# Patient Record
Sex: FEMALE | NOT HISPANIC OR LATINO | Employment: FULL TIME | URBAN - METROPOLITAN AREA
[De-identification: names, ages, dates, MRNs, and addresses within clinical notes are randomized per-mention and may not be internally consistent; named-entity substitution may affect disease eponyms.]

---

## 2017-02-09 NOTE — PROGRESS NOTES
SUBJECTIVE:     CC: Erika Jasmine is an 21 year old woman who presents for preventive health visit.     Healthy Habits:    Do you get at least three servings of calcium containing foods daily (dairy, green leafy vegetables, etc.)? yes    Amount of exercise or daily activities, outside of work: 0 day(s) per week    Problems taking medications regularly No    Medication side effects: No    Have you had an eye exam in the past two years? no    Do you see a dentist twice per year? no  Do you have sleep apnea, excessive snoring or daytime drowsiness?yes      Gets panic attacks about 2-3 times a week.    Has seen therapist a long time ago.  Not interested in this.   Denies suicidal or homicidal thoughts.  Patient instructed to go to the emergency room or call 911 if these occur.  No depression.     Is engaged, has been with same partner for years.   Is trying to have a baby, has irregular periods. Has tried for years.  Has not met with fertility  specialist.     Never had pap smear.  Is due today.   No FH of breast cancer.       Smoker-tried to cut back but failed.  Is interested in trying something to help.       Today's PHQ-2 Score: No flowsheet data found.    Abuse: Current or Past(Physical, Sexual or Emotional)- No  Do you feel safe in your environment - Yes    Social History   Substance Use Topics     Smoking status: Current Every Day Smoker     Packs/day: 0.50     Years: 10.00     Types: Cigarettes     Smokeless tobacco: Not on file     Alcohol use Yes      Comment: OCC     The patient does not drink >3 drinks per day nor >7 drinks per week.    No results for input(s): CHOL, HDL, LDL, TRIG, CHOLHDLRATIO, NHDL in the last 66110 hours.    Reviewed orders with patient.  Reviewed health maintenance and updated orders accordingly - Yes    Mammo Decision Support:  Mammogram not appropriate for this patient based on age.    Pertinent mammograms are reviewed under the imaging tab.  History of abnormal Pap smear: NO -  "age 21-29 PAP every 3 years recommended  All Histories reviewed and updated in Epic.  Past Medical History   Diagnosis Date     NO ACTIVE PROBLEMS       Past Surgical History   Procedure Laterality Date     Orthopedic surgery       L foot surgery at the age of 7 yrs old     Obstetric History     No data available          ROS:  C: NEGATIVE for fever, chills, change in weight  I: NEGATIVE for worrisome rashes, moles or lesions  E: NEGATIVE for vision changes or irritation  ENT: NEGATIVE for ear, mouth and throat problems  R: NEGATIVE for significant cough or SOB  B: NEGATIVE for masses, tenderness or discharge  CV: NEGATIVE for chest pain, palpitations or peripheral edema  GI: NEGATIVE for nausea, abdominal pain, heartburn, or change in bowel habits  M: NEGATIVE for significant arthralgias or myalgia  N: NEGATIVE for weakness, dizziness or paresthesias    Problem list, Medication list, Allergies, and Medical/Social/Surgical histories reviewed in [facility] and updated as appropriate.  OBJECTIVE:     /74  Pulse 82  Temp 99.1  F (37.3  C) (Oral)  Ht 5' 4\" (1.626 m)  Wt 119 lb (54 kg)  LMP 02/06/2017  SpO2 99%  Breastfeeding? No  BMI 20.43 kg/m2  EXAM:  GENERAL: healthy, alert and no distress  EYES: Eyes grossly normal to inspection, PERRL and conjunctivae and sclerae normal  HENT: ear canals and TM's normal, nose and mouth without ulcers or lesions  NECK: no adenopathy, no asymmetry, masses, or scars and thyroid normal to palpation  RESP: lungs clear to auscultation - no rales, rhonchi or wheezes  BREAST: normal without masses, tenderness or nipple discharge and no palpable axillary masses or adenopathy  CV: regular rate and rhythm, normal S1 S2, no S3 or S4, no murmur, click or rub, no peripheral edema and peripheral pulses strong  ABDOMEN: soft, nontender, no hepatosplenomegaly, no masses and bowel sounds normal   (female): normal female external genitalia, normal urethral meatus, vaginal mucosa pink, moist, well " "rugated, and normal cervix/adnexa/uterus without masses or discharge  MS: no gross musculoskeletal defects noted, no edema  SKIN: no suspicious lesions or rashes  NEURO: Normal strength and tone, mentation intact and speech normal  PSYCH: mentation appears normal, affect normal/bright    ASSESSMENT/PLAN:     1. Encounter for routine adult health examination with abnormal findings      2. Screening examination for venereal disease    - NEISSERIA GONORRHOEA PCR  - CHLAMYDIA TRACHOMATIS PCR    3. Screening for malignant neoplasm of cervix      4. Need for prophylactic vaccination with tetanus-diphtheria (TD)    - TDAP (ADACEL AGES 11-64)    5. Tobacco abuse  Willing to try to quit, will try patches  - nicotine (NICODERM CQ) 14 MG/24HR 24 hr patch; Place 1 patch onto the skin every 24 hours Take for 6 weeks.  Dispense: 30 patch; Refill: 1  - nicotine (NICODERM CQ) 7 MG/24HR 24 hr patch; Place 1 patch onto the skin every 24 hours For 2 weeks  Dispense: 14 patch; Refill: 0    6. Female infertility  Several years and has not conceived, engaged currently    - INFERTILITY/AI REFERRAL    7. Anxiety  Side effects discussed  Recheck in clinic if not improving  Strongly encourage thinking about therapy  - hydrOXYzine (ATARAX) 25 MG tablet; Take 1-2 tablets once daily as needed for acute anxiety. Do not mix with alcohol.  Dispense: 60 tablet; Refill: 1    COUNSELING:   Reviewed preventive health counseling, as reflected in patient instructions       Regular exercise       Healthy diet/nutrition       Vision screening       Hearing screening         reports that she has been smoking Cigarettes.  She has a 5.00 pack-year smoking history. She does not have any smokeless tobacco history on file.  Tobacco Cessation Action Plan: Pharmacotherapies : Nicotine patch  Estimated body mass index is 20.43 kg/(m^2) as calculated from the following:    Height as of this encounter: 5' 4\" (1.626 m).    Weight as of this encounter: 119 lb (54 kg). "       Counseling Resources:  ATP IV Guidelines  Pooled Cohorts Equation Calculator  Breast Cancer Risk Calculator  FRAX Risk Assessment  ICSI Preventive Guidelines  Dietary Guidelines for Americans, 2010  USDA's MyPlate  ASA Prophylaxis  Lung CA Screening    Patient Instructions     Preventive Health Recommendations  Female Ages 18 to 25     Yearly exam:     See your health care provider every year in order to  o Review health changes.   o Discuss preventive care.    o Review your medicines if your doctor has prescribed any.      You should be tested each year for STDs (sexually transmitted diseases).       After age 20, talk to your provider about how often you should have cholesterol testing.      Starting at age 21, get a Pap test every three years. If you have an abnormal result, your doctor may have you test more often.      If you are at risk for diabetes, you should have a diabetes test (fasting glucose).     Shots:     Get a flu shot each year.     Get a tetanus shot every 10 years.     Consider getting the shot (vaccine) that prevents cervical cancer (Gardasil).    Nutrition:     Eat at least 5 servings of fruits and vegetables each day.    Eat whole-grain bread, whole-wheat pasta and brown rice instead of white grains and rice.    Talk to your provider about Calcium and Vitamin D.     Lifestyle    Exercise at least 150 minutes a week each week (30 minutes a day, 5 days a week). This will help you control your weight and prevent disease.    Limit alcohol to one drink per day.    No smoking.     Wear sunscreen to prevent skin cancer.    See your dentist every six months for an exam and cleaning.      Karissa Xie PA-C  [facility]

## 2017-02-16 ENCOUNTER — OFFICE VISIT (OUTPATIENT)
Dept: FAMILY MEDICINE | Facility: CLINIC | Age: 22
End: 2017-02-16
Payer: COMMERCIAL

## 2017-02-16 VITALS
HEART RATE: 82 BPM | TEMPERATURE: 99.1 F | SYSTOLIC BLOOD PRESSURE: 124 MMHG | BODY MASS INDEX: 20.32 KG/M2 | HEIGHT: 64 IN | WEIGHT: 119 LBS | DIASTOLIC BLOOD PRESSURE: 74 MMHG | OXYGEN SATURATION: 99 %

## 2017-02-16 DIAGNOSIS — Z00.01 ENCOUNTER FOR ROUTINE ADULT HEALTH EXAMINATION WITH ABNORMAL FINDINGS: Primary | ICD-10-CM

## 2017-02-16 DIAGNOSIS — N97.9 FEMALE INFERTILITY: ICD-10-CM

## 2017-02-16 DIAGNOSIS — Z72.0 TOBACCO ABUSE: ICD-10-CM

## 2017-02-16 DIAGNOSIS — F41.9 ANXIETY: ICD-10-CM

## 2017-02-16 DIAGNOSIS — Z12.4 SCREENING FOR MALIGNANT NEOPLASM OF CERVIX: ICD-10-CM

## 2017-02-16 DIAGNOSIS — Z23 NEED FOR PROPHYLACTIC VACCINATION WITH TETANUS-DIPHTHERIA (TD): ICD-10-CM

## 2017-02-16 DIAGNOSIS — Z11.3 SCREENING EXAMINATION FOR VENEREAL DISEASE: ICD-10-CM

## 2017-02-16 LAB
C TRACH DNA SPEC QL NAA+PROBE: NORMAL
N GONORRHOEA DNA SPEC QL NAA+PROBE: NORMAL
SPECIMEN SOURCE: NORMAL
SPECIMEN SOURCE: NORMAL

## 2017-02-16 PROCEDURE — 99385 PREV VISIT NEW AGE 18-39: CPT | Performed by: PHYSICIAN ASSISTANT

## 2017-02-16 PROCEDURE — 87491 CHLMYD TRACH DNA AMP PROBE: CPT | Performed by: PHYSICIAN ASSISTANT

## 2017-02-16 PROCEDURE — 87591 N.GONORRHOEAE DNA AMP PROB: CPT | Performed by: PHYSICIAN ASSISTANT

## 2017-02-16 PROCEDURE — G0145 SCR C/V CYTO,THINLAYER,RESCR: HCPCS | Performed by: PHYSICIAN ASSISTANT

## 2017-02-16 RX ORDER — HYDROXYZINE HYDROCHLORIDE 25 MG/1
25-50 TABLET, FILM COATED ORAL EVERY 6 HOURS PRN
Qty: 60 TABLET | Refills: 1 | Status: SHIPPED | OUTPATIENT
Start: 2017-02-16 | End: 2017-02-16

## 2017-02-16 RX ORDER — NICOTINE 21 MG/24HR
1 PATCH, TRANSDERMAL 24 HOURS TRANSDERMAL EVERY 24 HOURS
Qty: 30 PATCH | Refills: 1 | Status: SHIPPED | OUTPATIENT
Start: 2017-02-16

## 2017-02-16 RX ORDER — HYDROXYZINE HYDROCHLORIDE 25 MG/1
TABLET, FILM COATED ORAL
Qty: 60 TABLET | Refills: 1 | Status: SHIPPED | OUTPATIENT
Start: 2017-02-16

## 2017-02-16 NOTE — LETTER
[facility]  [address] [id number] [phone]        February 17, 2017    Erika Jasmine  [address] [place] [address]            Dear Erika,    The results of your recent tests were normal.   It was a pleasure to see you at your last appointment.    If you have any questions or concerns, please call myself or my nurse at [phone].    Sincerely,    Karissa Xie PA-C

## 2017-02-16 NOTE — NURSING NOTE
"Chief Complaint   Patient presents with     Physical     AFE, Pt is not fasting and did not have labs done        Initial /74  Pulse 82  Temp 99.1  F (37.3  C) (Oral)  Ht 5' 4\" (1.626 m)  Wt 119 lb (54 kg)  LMP 02/06/2017  SpO2 99%  Breastfeeding? No  BMI 20.43 kg/m2 Estimated body mass index is 20.43 kg/(m^2) as calculated from the following:    Height as of this encounter: 5' 4\" (1.626 m).    Weight as of this encounter: 119 lb (54 kg).  Medication Reconciliation: complete      Jael Elder MA    "

## 2017-02-16 NOTE — MR AVS SNAPSHOT
After Visit Summary   2/16/2017    Erika Jasmine    MRN: [id number]           Patient Information     Date Of Birth          1995        Visit Information        Provider Department      2/16/2017 9:20 AM Karissa Xie PA-C [facility]        Today's Diagnoses     Encounter for routine adult health examination with abnormal findings    -  1    Screening examination for venereal disease        Screening for malignant neoplasm of cervix        Need for prophylactic vaccination with tetanus-diphtheria (TD)        Tobacco abuse        Female infertility        Anxiety          Care Instructions      Preventive Health Recommendations  Female Ages 18 to 25     Yearly exam:     See your health care provider every year in order to  o Review health changes.   o Discuss preventive care.    o Review your medicines if your doctor has prescribed any.      You should be tested each year for STDs (sexually transmitted diseases).       After age 20, talk to your provider about how often you should have cholesterol testing.      Starting at age 21, get a Pap test every three years. If you have an abnormal result, your doctor may have you test more often.      If you are at risk for diabetes, you should have a diabetes test (fasting glucose).     Shots:     Get a flu shot each year.     Get a tetanus shot every 10 years.     Consider getting the shot (vaccine) that prevents cervical cancer (Gardasil).    Nutrition:     Eat at least 5 servings of fruits and vegetables each day.    Eat whole-grain bread, whole-wheat pasta and brown rice instead of white grains and rice.    Talk to your provider about Calcium and Vitamin D.     Lifestyle    Exercise at least 150 minutes a week each week (30 minutes a day, 5 days a week). This will help you control your weight and prevent disease.    Limit alcohol to one drink per day.    No smoking.     Wear sunscreen to prevent skin cancer.    See your  "dentist every six months for an exam and cleaning.        Follow-ups after your visit        Additional Services     INFERTILITY/AI REFERRAL       Your provider has referred you to: FMRITA: [facility] [phone]   [email or web address]    Please be aware that coverage of these services is subject to the terms and limitations of your health insurance plan.  Call member services at your health plan with any benefit or coverage questions.      Please bring the following with you to your appointment:    (1) Any X-Rays, CTs or MRIs which have been performed.  Contact the facility where they were done to arrange for  prior to your scheduled appointment.    (2) List of current medications   (3) This referral request   (4) Any documents/labs given to you for this referral                  Who to contact     If you have questions or need follow up information about today's clinic visit or your schedule please contact [facility] directly at [phone].  Normal or non-critical lab and imaging results will be communicated to you by MyChart, letter or phone within 4 business days after the clinic has received the results. If you do not hear from us within 7 days, please contact the clinic through [employer] or phone. If you have a critical or abnormal lab result, we will notify you by phone as soon as possible.  Submit refill requests through [employer] or call your pharmacy and they will forward the refill request to us. Please allow 3 business days for your refill to be completed.          Additional Information About Your Visit        [employer] Information     [employer] lets you send messages to your doctor, view your test results, renew your prescriptions, schedule appointments and more. To sign up, go to [email or web address] . Click on \"Log in\" on the left side of the screen, which will take you to the Welcome page. Then click on \"Sign up Now\" on the right side of " "the page.     You will be asked to enter the access code listed below, as well as some personal information. Please follow the directions to create your username and password.     Your access code is: [email or web address]  Expires: 2017  8:48 AM     Your access code will  in 90 days. If you need help or a new code, please call your [facility] clinic or [phone].        Care EveryWhere ID     This is your Care EveryWhere ID. This could be used by other organizations to access your [facility] medical records  [id number]        Your Vitals Were     Pulse Temperature Height Last Period Pulse Oximetry Breastfeeding?    82 99.1  F (37.3  C) (Oral) 5' 4\" (1.626 m) 2017 99% No    BMI (Body Mass Index)                   20.43 kg/m2            Blood Pressure from Last 3 Encounters:   17 124/74    Weight from Last 3 Encounters:   17 119 lb (54 kg)              We Performed the Following     CHLAMYDIA TRACHOMATIS PCR     INFERTILITY/AI REFERRAL     NEISSERIA GONORRHOEA PCR     TDAP (ADACEL AGES 11-64)          Today's Medication Changes          These changes are accurate as of: 17  9:56 AM.  If you have any questions, ask your nurse or doctor.               Start taking these medicines.        Dose/Directions    hydrOXYzine 25 MG tablet   Commonly known as:  ATARAX   Used for:  Anxiety   Started by:  Karissa Xie PA-C        Take 1-2 tablets once daily as needed for acute anxiety. Do not mix with alcohol.   Quantity:  60 tablet   Refills:  1       * nicotine 14 MG/24HR 24 hr patch   Commonly known as:  NICODERM CQ   Used for:  Tobacco abuse   Started by:  Karissa Xie PA-C        Dose:  1 patch   Place 1 patch onto the skin every 24 hours Take for 6 weeks.   Quantity:  30 patch   Refills:  1       * nicotine 7 MG/24HR 24 hr patch   Commonly known as:  NICODERM CQ   Used for:  Tobacco abuse   Started by:  Karissa Xie PA-C        Dose:  1 patch   Place 1 patch " onto the skin every 24 hours For 2 weeks   Quantity:  14 patch   Refills:  0       * Notice:  This list has 2 medication(s) that are the same as other medications prescribed for you. Read the directions carefully, and ask your doctor or other care provider to review them with you.         Where to get your medicines      These medications were sent to [facility] [address] [place], [place] - [address] [address]     Phone:  [phone]     hydrOXYzine 25 MG tablet    nicotine 14 MG/24HR 24 hr patch    nicotine 7 MG/24HR 24 hr patch                Primary Care Provider    None Specified       No primary provider on file.        Thank you!     Thank you for choosing [facility]  for your care. Our goal is always to provide you with excellent care. Hearing back from our patients is one way we can continue to improve our services. Please take a few minutes to complete the written survey that you may receive in the mail after your visit with us. Thank you!             Your Updated Medication List - Protect others around you: Learn how to safely use, store and throw away your medicines at www.disposemymeds.org.          This list is accurate as of: 2/16/17  9:56 AM.  Always use your most recent med list.                   Brand Name Dispense Instructions for use    hydrOXYzine 25 MG tablet    ATARAX    60 tablet    Take 1-2 tablets once daily as needed for acute anxiety. Do not mix with alcohol.       * nicotine 14 MG/24HR 24 hr patch    NICODERM CQ    30 patch    Place 1 patch onto the skin every 24 hours Take for 6 weeks.       * nicotine 7 MG/24HR 24 hr patch    NICODERM CQ    14 patch    Place 1 patch onto the skin every 24 hours For 2 weeks       * Notice:  This list has 2 medication(s) that are the same as other medications prescribed for you. Read the directions carefully, and ask your doctor or other care provider to review them with you.

## 2017-02-16 NOTE — LETTER
[facility]  [address] [facility] [id number] [phone]      February 21, 2017    Erika Jasmine  [address] [place] [address]          Dear Erika,    I am happy to inform you that your recent cervical cancer screening test (PAP smear) was normal.      Preventative screening such as this helps insure your health for years to come.  This test should be repeated in 3 years unless otherwise directed.    You will still need to return to the clinic every year for your annual exam and other preventive tests.    Please contact the clinic if you have further questions.      Sincerely,    Karissa Xie PA-C/kelsy

## 2017-02-17 NOTE — PROGRESS NOTES
Dear Erika,      It was a pleasure to see you at your recent office visit.  Your test results are listed below. STD testing negative. No chlamydia or gonorrhea. Always use condoms to prevent STDs. Re-check in one year or after changing sexual partners.           If you have any questions or concerns, please call the clinic at [phone].    Sincerely,  Karissa Xie PA-C

## 2017-02-20 LAB
COPATH REPORT: NORMAL
PAP: NORMAL

## 2019-11-30 ENCOUNTER — TELEPHONE (OUTPATIENT)
Dept: OTHER | Facility: CLINIC | Age: 24
End: 2019-11-30

## 2019-11-30 NOTE — TELEPHONE ENCOUNTER
11/30/2019    Call Regarding ReattributionPhysical       Attempt 2    Message on voicemail    Comments:       Outreach   GB

## 2019-12-23 NOTE — TELEPHONE ENCOUNTER
12/23/2019    Call Regarding ReattributionPhysical       Attempt 3    Message on voicemail    Comments:       Outreach   CS

## 2023-11-17 ENCOUNTER — HOSPITAL ENCOUNTER (EMERGENCY)
Facility: CLINIC | Age: 28
Discharge: HOME | End: 2023-11-17
Attending: EMERGENCY MEDICINE | Admitting: EMERGENCY MEDICINE

## 2023-11-17 ENCOUNTER — APPOINTMENT (OUTPATIENT)
Dept: GENERAL RADIOLOGY | Facility: CLINIC | Age: 28
End: 2023-11-17
Attending: EMERGENCY MEDICINE

## 2023-11-17 VITALS
HEART RATE: 74 BPM | OXYGEN SATURATION: 100 % | BODY MASS INDEX: 23.05 KG/M2 | SYSTOLIC BLOOD PRESSURE: 123 MMHG | HEIGHT: 64 IN | DIASTOLIC BLOOD PRESSURE: 88 MMHG | WEIGHT: 135 LBS | RESPIRATION RATE: 16 BRPM | TEMPERATURE: 97.7 F

## 2023-11-17 DIAGNOSIS — R07.9 CHEST PAIN, UNSPECIFIED TYPE: ICD-10-CM

## 2023-11-17 LAB
ALBUMIN SERPL BCG-MCNC: 4 G/DL (ref 3.5–5.2)
ALP SERPL-CCNC: 46 U/L (ref 40–150)
ALT SERPL W P-5'-P-CCNC: 14 U/L (ref 0–50)
ANION GAP SERPL CALCULATED.3IONS-SCNC: 11 MMOL/L (ref 7–15)
AST SERPL W P-5'-P-CCNC: 18 U/L (ref 0–45)
BASOPHILS # BLD AUTO: 0.1 10E3/UL (ref 0–0.2)
BASOPHILS NFR BLD AUTO: 1 %
BILIRUB SERPL-MCNC: 0.2 MG/DL
BUN SERPL-MCNC: 9 MG/DL (ref 6–20)
CALCIUM SERPL-MCNC: 9.1 MG/DL (ref 8.6–10)
CHLORIDE SERPL-SCNC: 105 MMOL/L (ref 98–107)
CREAT SERPL-MCNC: 0.48 MG/DL (ref 0.51–0.95)
D DIMER PPP FEU-MCNC: 0.34 UG/ML FEU (ref 0–0.5)
DEPRECATED HCO3 PLAS-SCNC: 23 MMOL/L (ref 22–29)
EGFRCR SERPLBLD CKD-EPI 2021: >90 ML/MIN/1.73M2
EOSINOPHIL # BLD AUTO: 0.2 10E3/UL (ref 0–0.7)
EOSINOPHIL NFR BLD AUTO: 2 %
ERYTHROCYTE [DISTWIDTH] IN BLOOD BY AUTOMATED COUNT: 14.2 % (ref 10–15)
GLUCOSE SERPL-MCNC: 104 MG/DL (ref 70–99)
HCG SERPL QL: NEGATIVE
HCT VFR BLD AUTO: 37.7 % (ref 35–47)
HGB BLD-MCNC: 13.1 G/DL (ref 11.7–15.7)
IMM GRANULOCYTES # BLD: 0 10E3/UL
IMM GRANULOCYTES NFR BLD: 0 %
LIPASE SERPL-CCNC: 21 U/L (ref 13–60)
LYMPHOCYTES # BLD AUTO: 1.8 10E3/UL (ref 0.8–5.3)
LYMPHOCYTES NFR BLD AUTO: 20 %
MCH RBC QN AUTO: 33.8 PG (ref 26.5–33)
MCHC RBC AUTO-ENTMCNC: 34.7 G/DL (ref 31.5–36.5)
MCV RBC AUTO: 97 FL (ref 78–100)
MONOCYTES # BLD AUTO: 0.7 10E3/UL (ref 0–1.3)
MONOCYTES NFR BLD AUTO: 8 %
NEUTROPHILS # BLD AUTO: 6.4 10E3/UL (ref 1.6–8.3)
NEUTROPHILS NFR BLD AUTO: 69 %
NRBC # BLD AUTO: 0 10E3/UL
NRBC BLD AUTO-RTO: 0 /100
PLATELET # BLD AUTO: 380 10E3/UL (ref 150–450)
POTASSIUM SERPL-SCNC: 3.7 MMOL/L (ref 3.4–5.3)
PROT SERPL-MCNC: 7.1 G/DL (ref 6.4–8.3)
RBC # BLD AUTO: 3.88 10E6/UL (ref 3.8–5.2)
SODIUM SERPL-SCNC: 139 MMOL/L (ref 135–145)
TROPONIN T SERPL HS-MCNC: <6 NG/L
WBC # BLD AUTO: 9.2 10E3/UL (ref 4–11)

## 2023-11-17 PROCEDURE — 83690 ASSAY OF LIPASE: CPT | Performed by: EMERGENCY MEDICINE

## 2023-11-17 PROCEDURE — 84703 CHORIONIC GONADOTROPIN ASSAY: CPT | Performed by: EMERGENCY MEDICINE

## 2023-11-17 PROCEDURE — 93005 ELECTROCARDIOGRAM TRACING: CPT | Performed by: EMERGENCY MEDICINE

## 2023-11-17 PROCEDURE — 99284 EMERGENCY DEPT VISIT MOD MDM: CPT | Mod: 25 | Performed by: EMERGENCY MEDICINE

## 2023-11-17 PROCEDURE — 93010 ELECTROCARDIOGRAM REPORT: CPT | Performed by: EMERGENCY MEDICINE

## 2023-11-17 PROCEDURE — 85379 FIBRIN DEGRADATION QUANT: CPT | Performed by: EMERGENCY MEDICINE

## 2023-11-17 PROCEDURE — 80053 COMPREHEN METABOLIC PANEL: CPT | Performed by: EMERGENCY MEDICINE

## 2023-11-17 PROCEDURE — 82947 ASSAY GLUCOSE BLOOD QUANT: CPT | Performed by: EMERGENCY MEDICINE

## 2023-11-17 PROCEDURE — 85025 COMPLETE CBC W/AUTO DIFF WBC: CPT | Performed by: EMERGENCY MEDICINE

## 2023-11-17 PROCEDURE — 36415 COLL VENOUS BLD VENIPUNCTURE: CPT | Performed by: EMERGENCY MEDICINE

## 2023-11-17 PROCEDURE — 71046 X-RAY EXAM CHEST 2 VIEWS: CPT

## 2023-11-17 PROCEDURE — 84484 ASSAY OF TROPONIN QUANT: CPT | Performed by: EMERGENCY MEDICINE

## 2023-11-17 PROCEDURE — 99285 EMERGENCY DEPT VISIT HI MDM: CPT | Mod: 25 | Performed by: EMERGENCY MEDICINE

## 2023-11-17 RX ORDER — KETOROLAC TROMETHAMINE 15 MG/ML
15 INJECTION, SOLUTION INTRAMUSCULAR; INTRAVENOUS ONCE
Status: DISCONTINUED | OUTPATIENT
Start: 2023-11-17 | End: 2023-11-17 | Stop reason: HOSPADM

## 2023-11-17 ASSESSMENT — ACTIVITIES OF DAILY LIVING (ADL): ADLS_ACUITY_SCORE: 35

## 2023-11-17 NOTE — ED TRIAGE NOTES
"Pt here with chest pain. Pt reports accidental overdose on Monday of \"laced OxyContin that had cocaine in it\". Pt was seen at [facility] and admitted to their ICU.      Triage Assessment (Adult)       Row Name 11/17/23 0922          Triage Assessment    Airway WDL WDL        Respiratory WDL    Respiratory WDL WDL        Skin Circulation/Temperature WDL    Skin Circulation/Temperature WDL WDL        Cardiac WDL    Cardiac WDL chest pain        Chest Pain Assessment    Chest Pain Location midsternal        Peripheral/Neurovascular WDL    Peripheral Neurovascular WDL WDL        Cognitive/Neuro/Behavioral WDL    Cognitive/Neuro/Behavioral WDL WDL                     "

## 2023-11-17 NOTE — ED PROVIDER NOTES
History     Chief Complaint   Patient presents with    Chest Pain     HPI  Erika Jasmine is a 28 year old female with past medical history significant for anxiety female infertility and recent accidental drug overdose on 11/13/2023 who presents emergency department complaining of left-sided chest pain.  Patient states she has pain in his medial of the sternum and underneath her left breast.  Pain is a sharp pain that has been present for several days.  Pain worsens somewhat with activity and deep breathing.  She did have chest compressions done on her when she does.  She denies any fevers or chills has not had any headache or visual changes denies any neck pain denies any nausea vomiting diarrhea or abdominal pain has not had back pain denies focal numbness weakness in any extremity or bowel or bladder dysfunction.    Allergies:  No Known Allergies    Problem List:    Patient Active Problem List    Diagnosis Date Noted    Tobacco abuse 02/16/2017     Priority: Medium    Anxiety 02/16/2017     Priority: Medium    Female infertility 02/16/2017     Priority: Medium        Past Medical History:    Past Medical History:   Diagnosis Date    NO ACTIVE PROBLEMS        Past Surgical History:    Past Surgical History:   Procedure Laterality Date    ORTHOPEDIC SURGERY      L foot surgery at the age of 7 yrs old       Family History:    Family History   Problem Relation Age of Onset    Other Cancer Paternal Grandmother     Other Cancer Paternal Grandfather        Social History:  Marital Status:  Single [1]  Social History     Tobacco Use    Smoking status: Every Day     Packs/day: 0.50     Years: 10.00     Additional pack years: 0.00     Total pack years: 5.00     Types: Cigarettes   Substance Use Topics    Alcohol use: Yes     Comment: OCC    Drug use: No        Medications:    hydrOXYzine (ATARAX) 25 MG tablet  nicotine (NICODERM CQ) 14 MG/24HR 24 hr patch  nicotine (NICODERM CQ) 7 MG/24HR 24 hr patch          Review of  "Systems  As per HPI.  Physical Exam   BP: (!) 144/90  Pulse: 87  Temp: 97  F (36.1  C)  Resp: 18  Height: 162.6 cm (5' 4\")  Weight: 61.2 kg (135 lb)  SpO2: 99 %      Physical Exam  Vitals and nursing note reviewed.   Constitutional:       General: She is not in acute distress.     Appearance: Normal appearance. She is not ill-appearing, toxic-appearing or diaphoretic.   HENT:      Head: Normocephalic and atraumatic.      Nose: Nose normal.      Mouth/Throat:      Mouth: Mucous membranes are moist.      Pharynx: Oropharynx is clear.   Eyes:      Conjunctiva/sclera: Conjunctivae normal.   Cardiovascular:      Rate and Rhythm: Normal rate and regular rhythm.      Pulses: Normal pulses.      Heart sounds: Normal heart sounds. No murmur heard.  Pulmonary:      Effort: Pulmonary effort is normal.      Breath sounds: No stridor. No wheezing or rhonchi.      Comments: Breath sounds are mildly decreased at bases there is mild tenderness to palpation of the left sternal region and left chest region this somewhat reproduces the pain.  Abdominal:      General: Abdomen is flat. There is no distension.      Palpations: Abdomen is soft.      Tenderness: There is no abdominal tenderness. There is no right CVA tenderness or left CVA tenderness.   Musculoskeletal:         General: No swelling or tenderness. Normal range of motion.      Cervical back: Normal range of motion and neck supple.      Right lower leg: No edema.      Left lower leg: No edema.   Skin:     General: Skin is warm and dry.      Findings: No rash.   Neurological:      General: No focal deficit present.      Mental Status: She is alert and oriented to person, place, and time.      Sensory: No sensory deficit.      Motor: No weakness.      Coordination: Coordination normal.   Psychiatric:         Mood and Affect: Mood normal.         ED Course                 Procedures              EKG Interpretation:      Interpreted by Truong Malloy MD  Rhythm: normal " sinus   Rate: Normal  Axis: Normal  Ectopy: none  Conduction: Shortened [place] intervalnormal  ST Segments/ T Waves: Non-specific ST-T wave changes  Q Waves: none  Comparison to prior: No old EKG available    Clinical Impression: Normal sinus rhythm with shortened [place] interval and nonspecific ST-T wave changes.    Critical Care time:  none               Labs Ordered and Resulted from Time of ED Arrival to Time of ED Departure   COMPREHENSIVE METABOLIC PANEL - Abnormal       Result Value    Sodium 139      Potassium 3.7      Carbon Dioxide (CO2) 23      Anion Gap 11      Urea Nitrogen 9.0      Creatinine 0.48 (*)     GFR Estimate >90      Calcium 9.1      Chloride 105      Glucose 104 (*)     Alkaline Phosphatase 46      AST 18      ALT 14      Protein Total 7.1      Albumin 4.0      Bilirubin Total 0.2     CBC WITH PLATELETS AND DIFFERENTIAL - Abnormal    WBC Count 9.2      RBC Count 3.88      Hemoglobin 13.1      Hematocrit 37.7      MCV 97      MCH 33.8 (*)     MCHC 34.7      RDW 14.2      Platelet Count 380      % Neutrophils 69      % Lymphocytes 20      % Monocytes 8      % Eosinophils 2      % Basophils 1      % Immature Granulocytes 0      NRBCs per 100 WBC 0      Absolute Neutrophils 6.4      Absolute Lymphocytes 1.8      Absolute Monocytes 0.7      Absolute Eosinophils 0.2      Absolute Basophils 0.1      Absolute Immature Granulocytes 0.0      Absolute NRBCs 0.0     LIPASE - Normal    Lipase 21     D DIMER QUANTITATIVE - Normal    D-Dimer Quantitative 0.34     TROPONIN T, HIGH SENSITIVITY - Normal    Troponin T, High Sensitivity <6     HCG QUALITATIVE PREGNANCY - Normal    hCG Serum Qualitative Negative        Results for orders placed or performed during the hospital encounter of 11/17/23   Chest XR,  PA & LAT    Narrative    CHEST TWO VIEWS 11/17/2023 11:51 AM     HISTORY: Shortness of breath.    COMPARISON: None available       Impression    IMPRESSION: No focal consolidation, pleural effusion or  pneumothorax.  Cardiomediastinal silhouette is unremarkable.     ANGELICA SAGE MD         SYSTEM ID:  [id number]        Medications - No data to display    Assessments & Plan (with Medical Decision Making) past medical history medications and allergies were reviewed.  Recent admission for accidental opiate overdose was reviewed.  EKG revealed a normal sinus rhythm with shortened [place] interval and nonspecific ST-T wave changes no old EKG for comparison.  Labs were obtained.  CBC without significant abnormality.  Comprehensive metabolic panel is unremarkable.  Pregnancy test negative lipase 41 troponin was less than 6.  Chest x-ray was obtained and was unremarkable.  I independently reviewed and interpreted this.Findings discussed with patient in detail I feel chest pain is more likely musculoskeletal in nature I have considered PE but she is not tachycardic and not significantly short of breath her oxygen saturations are within normal limits.  She does not have risk factors for PE.  I feel more likely this is musculoskeletal pleuritic or neurogenic in nature.  She should take ibuprofen or Tylenol for pain and return if increased shortness of breath chest pain weakness altered mental status or other symptoms present.  Patient feels comfortable this plan at this time.     I have reviewed the nursing notes.    I have reviewed the findings, diagnosis, plan and need for follow up with the patient.           Discharge Medication List as of 11/17/2023 12:57 PM          Final diagnoses:   Chest pain, unspecified type   Records reviewed.    11/17/2023   [facility] EMERGENCY DEPT       Mellissa, Truong Pires MD  11/19/23 1007

## 2024-07-24 ENCOUNTER — HOSPITAL ENCOUNTER (EMERGENCY)
Facility: CLINIC | Age: 29
Discharge: HOME | End: 2024-07-24
Attending: EMERGENCY MEDICINE | Admitting: EMERGENCY MEDICINE

## 2024-07-24 VITALS
WEIGHT: 138 LBS | HEIGHT: 64 IN | BODY MASS INDEX: 23.56 KG/M2 | SYSTOLIC BLOOD PRESSURE: 135 MMHG | HEART RATE: 85 BPM | DIASTOLIC BLOOD PRESSURE: 89 MMHG | TEMPERATURE: 98.8 F | RESPIRATION RATE: 18 BRPM | OXYGEN SATURATION: 99 %

## 2024-07-24 DIAGNOSIS — R25.1 TREMULOUSNESS: ICD-10-CM

## 2024-07-24 DIAGNOSIS — R11.0 NAUSEA: ICD-10-CM

## 2024-07-24 LAB — GLUCOSE BLDC GLUCOMTR-MCNC: 93 MG/DL (ref 70–99)

## 2024-07-24 PROCEDURE — 82962 GLUCOSE BLOOD TEST: CPT

## 2024-07-24 PROCEDURE — 250N000013 HC RX MED GY IP 250 OP 250 PS 637: Performed by: EMERGENCY MEDICINE

## 2024-07-24 PROCEDURE — 99283 EMERGENCY DEPT VISIT LOW MDM: CPT | Performed by: EMERGENCY MEDICINE

## 2024-07-24 PROCEDURE — 99284 EMERGENCY DEPT VISIT MOD MDM: CPT | Performed by: EMERGENCY MEDICINE

## 2024-07-24 RX ORDER — HYDROXYZINE HYDROCHLORIDE 25 MG/1
25-50 TABLET, FILM COATED ORAL 3 TIMES DAILY PRN
Qty: 50 TABLET | Refills: 0 | Status: SHIPPED | OUTPATIENT
Start: 2024-07-24

## 2024-07-24 RX ORDER — DIAZEPAM 5 MG
5 TABLET ORAL ONCE
Status: COMPLETED | OUTPATIENT
Start: 2024-07-24 | End: 2024-07-24

## 2024-07-24 RX ADMIN — DIAZEPAM 5 MG: 5 TABLET ORAL at 10:47

## 2024-07-24 ASSESSMENT — COLUMBIA-SUICIDE SEVERITY RATING SCALE - C-SSRS
1. IN THE PAST MONTH, HAVE YOU WISHED YOU WERE DEAD OR WISHED YOU COULD GO TO SLEEP AND NOT WAKE UP?: NO
2. HAVE YOU ACTUALLY HAD ANY THOUGHTS OF KILLING YOURSELF IN THE PAST MONTH?: NO
6. HAVE YOU EVER DONE ANYTHING, STARTED TO DO ANYTHING, OR PREPARED TO DO ANYTHING TO END YOUR LIFE?: NO

## 2024-07-24 ASSESSMENT — ACTIVITIES OF DAILY LIVING (ADL): ADLS_ACUITY_SCORE: 35

## 2024-07-24 NOTE — LETTER
July 24, 2024      To Whom It May Concern:      Erika ROBERT Mitali was seen in our Emergency Department today, 07/24/24.   Sincerely,        Anay Birmingham RN

## 2024-07-24 NOTE — ED TRIAGE NOTES
Pt reports over the past few weeks has had 4 episode where she shaky, sweaty and lightheaded. Pt denies fevers at this time.

## 2024-07-24 NOTE — ED PROVIDER NOTES
ED Provider Note  [facility]      History     Chief Complaint   Patient presents with    Shaking     Pt reports over the past few weeks has had 4 episode where she shaky, sweaty and lightheaded. Pt denies fevers at this time.     Dizziness     HPI  Erika Jasmine is a 29 year old female who has medical history significant for anxiety, tobacco abuse, presenting the emergency department with concerns regarding feelings of lightheadedness, and shakiness.    Patient reports that she has had known 4 total episodes over the last approximately 3 weeks where she will initially begin with episodes of shakiness of the upper extremities.  Then over wave all, over her body, she will feel lightheaded, dizzy, nauseous.  No vomiting.  Patient states occasional alcohol use.  Did have 1 beer yesterday.  Denies significant daily alcohol use.  Denies any drug use.  Notably patient has had a history of opioid overdose before in the past.  Patient denies any daily medications.  Denies any increased amounts of stress, anxiety.  No fever.  No other medication use.  No other ill contacts.  No travel.        Independent Historian:        Review of External Notes:  I reviewed medical records, including November 13, 2023 visit when patient was noted to have history of polysubstance abuse with cocaine, and opioids who had accidental overdose of opioids at that time.  She thought she was using cocaine, however that was laced with a narcotic.  This was treated with Narcan.      Allergies:  No Known Allergies    Problem List:    Patient Active Problem List    Diagnosis Date Noted    Tobacco abuse 02/16/2017     Priority: Medium    Anxiety 02/16/2017     Priority: Medium    Female infertility 02/16/2017     Priority: Medium        Past Medical History:    Past Medical History:   Diagnosis Date    NO ACTIVE PROBLEMS        Past Surgical History:    Past Surgical History:   Procedure Laterality Date    ORTHOPEDIC SURGERY    "   L foot surgery at the age of 7 yrs old       Family History:    Family History   Problem Relation Age of Onset    Other Cancer Paternal Grandmother     Other Cancer Paternal Grandfather        Social History:  Marital Status:  Single [1]  Social History     Tobacco Use    Smoking status: Every Day     Current packs/day: 0.50     Average packs/day: 0.5 packs/day for 10.0 years (5.0 ttl pk-yrs)     Types: Cigarettes   Substance Use Topics    Alcohol use: Yes     Comment: OCC    Drug use: No        Medications:    hydrOXYzine HCl (ATARAX) 25 MG tablet  hydrOXYzine (ATARAX) 25 MG tablet  nicotine (NICODERM CQ) 14 MG/24HR 24 hr patch  nicotine (NICODERM CQ) 7 MG/24HR 24 hr patch          Review of Systems  A medically appropriate review of systems was performed with pertinent positives and negatives noted in the HPI, and all other systems negative.    Physical Exam   Patient Vitals for the past 24 hrs:   BP Temp Temp src Pulse Resp SpO2 Height Weight   07/24/24 1029 (!) 141/97 98.8  F (37.1  C) Oral 112 18 99 % 1.626 m (5' 4\") 62.6 kg (138 lb)          Physical Exam  General: alert and in no acute distress on arrival  Head: atraumatic, normocephalic  Lungs:  nonlabored  CV:  extremities warm and perfused  Abd: nondistended  Skin: no rashes, no diaphoresis and skin color normal  Neuro: Patient awake, alert, speech is fluent,   Psychiatric: affect/mood normal,        ED Course                 Procedures                           Results for orders placed or performed during the hospital encounter of 07/24/24 (from the past 24 hour(s))   Glucose by meter   Result Value Ref Range    GLUCOSE BY METER POCT 93 70 - 99 mg/dL       MEDICATIONS GIVEN IN THE EMERGENCY DEPARTMENT:  Medications   diazepam (VALIUM) tablet 5 mg (5 mg Oral $Given 7/24/24 1047)           Independent Interpretation (X-rays, CTs, rhythm strip):  None    Consultations/Discussion of Management or Tests:  None       Social Determinants of Health affecting " care:         Assessments & Plan (with Medical Decision Making)  29 year old female who presents to the Emergency Department for evaluation of shakiness of the bilateral upper extremities.  Patient also with feelings of nausea, and slight sweating type behavior.  Patient denies any significant drug or alcohol use, however constellation of symptoms and patient's history would be more suggestive of potential withdrawal toxidrome.  Patient's blood sugar is normal.  She has completely normal neurologic exam.  There is slight tremulousness of the bilateral upper extremities, however finger-to-nose is normal, and remainder of neurologic exam is normal.    Patient does also have underlying anxiety.  5 mg oral Valium tablet is given.  Patient did have significant improvement of her symptoms.  I will discharge patient home, recommended follow-up with primary care provider for routine cares, return precautions discussed.       I have reviewed the nursing notes.    I have reviewed the findings, diagnosis, plan and need for follow up with the patient.       Critical Care time:  none      NEW PRESCRIPTIONS STARTED AT TODAY'S ER VISIT  New Prescriptions    HYDROXYZINE HCL (ATARAX) 25 MG TABLET    Take 1-2 tablets (25-50 mg) by mouth 3 times daily as needed for anxiety (shaking)       Final diagnoses:   Tremulousness   Nausea       7/24/2024   [facility] EMERGENCY DEPT       YovannyTruong chacon MD  07/24/24 7713

## 2024-11-17 ENCOUNTER — HOSPITAL ENCOUNTER (EMERGENCY)
Facility: CLINIC | Age: 29
Discharge: HOME | End: 2024-11-17
Attending: PHYSICIAN ASSISTANT | Admitting: PHYSICIAN ASSISTANT

## 2024-11-17 VITALS
TEMPERATURE: 98.5 F | OXYGEN SATURATION: 99 % | RESPIRATION RATE: 16 BRPM | DIASTOLIC BLOOD PRESSURE: 70 MMHG | HEART RATE: 95 BPM | SYSTOLIC BLOOD PRESSURE: 118 MMHG

## 2024-11-17 DIAGNOSIS — R21 RASH AND NONSPECIFIC SKIN ERUPTION: ICD-10-CM

## 2024-11-17 PROCEDURE — 99213 OFFICE O/P EST LOW 20 MIN: CPT | Performed by: PHYSICIAN ASSISTANT

## 2024-11-17 PROCEDURE — G0463 HOSPITAL OUTPT CLINIC VISIT: HCPCS | Performed by: PHYSICIAN ASSISTANT

## 2024-11-17 RX ORDER — CLOTRIMAZOLE 1 %
CREAM (GRAM) TOPICAL 2 TIMES DAILY
Qty: 15 G | Refills: 0 | Status: SHIPPED | OUTPATIENT
Start: 2024-11-17

## 2024-11-17 NOTE — ED PROVIDER NOTES
History     Chief Complaint   Patient presents with    Rash     Pt thinks she may have ringworm, pt is at [facility] and her roommate has rig worm. Pt has a couple dry itchy spots on arms     HPI  Erika Jasmine is a 29 year old female who currently she treatment is living who presents to urgent care with concern for possible tinea corporis after roommate has been dealing with ongoing rash and initiated on topical antifungals.  She for the last several days she has had pruritus of the lateral aspect of her right upper arm.  No other associated symptoms.  She has not attempted any OTC treatments consistently    Allergies:  No Known Allergies    Problem List:    Patient Active Problem List    Diagnosis Date Noted    Tobacco abuse 02/16/2017     Priority: Medium    Anxiety 02/16/2017     Priority: Medium    Female infertility 02/16/2017     Priority: Medium        Past Medical History:    Past Medical History:   Diagnosis Date    NO ACTIVE PROBLEMS        Past Surgical History:    Past Surgical History:   Procedure Laterality Date    ORTHOPEDIC SURGERY      L foot surgery at the age of 7 yrs old       Family History:    Family History   Problem Relation Age of Onset    Other Cancer Paternal Grandmother     Other Cancer Paternal Grandfather        Social History:  Marital Status:  Single [1]  Social History     Tobacco Use    Smoking status: Every Day     Current packs/day: 0.50     Average packs/day: 0.5 packs/day for 10.0 years (5.0 ttl pk-yrs)     Types: Cigarettes   Substance Use Topics    Alcohol use: Yes     Comment: OCC    Drug use: No        Medications:    clotrimazole (LOTRIMIN) 1 % external cream  hydrOXYzine (ATARAX) 25 MG tablet  hydrOXYzine HCl (ATARAX) 25 MG tablet  nicotine (NICODERM CQ) 14 MG/24HR 24 hr patch  nicotine (NICODERM CQ) 7 MG/24HR 24 hr patch      Review of Systems  Per HPI  Physical Exam   BP: 118/70  Pulse: 95  Temp: 98.5  F (36.9  C)  Resp: 16  SpO2: 99 %  Physical Exam  GENERAL  APPEARANCE: healthy, alert and no distress  EYES: EOMI,  PERRL, conjunctiva clear  HENT: normocephalic, atraumatic   NECK: Normal spontaneous range of motion  RESP: No respiratory distress, no audible wheezing,'s patient speaking in full sentences without difficulty  SKIN: 1 cm flesh colored dry flaky skin on lateral right upper arm   ED Course        Procedures       Critical Care time:  none           No results found for this or any previous visit (from the past 24 hours).    Medications - No data to display    Assessments & Plan (with Medical Decision Making)     I have reviewed the nursing notes.    I have reviewed the findings, diagnosis, plan and need for follow up with the patient.         New Prescriptions    CLOTRIMAZOLE (LOTRIMIN) 1 % EXTERNAL CREAM    Apply topically 2 times daily.       Final diagnoses:   Rash and nonspecific skin eruption dry skin vs early tinea infection     29-year-old female presents to urgent care with concern over possible tinea corporis after roommate at inpatient treatment facility was recently diagnosed.  She had stable vital signs upon arrival.  Physical exam findings significant for 1 cm flesh-colored dry flaky patch of skin which appears consistent with dry skin however cannot definitively rule out early tinea infection given exposure history.  Recommended symptomatic treatment with topical Prolia physical provide prescription for clotrimazole BID as until rash resolves.  Follow up with PCP if no improvement in 5-7 days. Worrisome reasons to return to ER/UC sooner discussed.     Disclaimer: This note consists of symbols derived from keyboarding, dictation, and/or voice recognition software. As a result, there may be errors in the script that have gone undetected.  Please consider this when interpreting information found in the chart.    11/17/2024   [facility] EMERGENCY DEPT       Azucena Erickson PA-C  11/17/24 7712

## 2025-02-16 ENCOUNTER — APPOINTMENT (OUTPATIENT)
Dept: CT IMAGING | Facility: CLINIC | Age: 30
End: 2025-02-16
Attending: EMERGENCY MEDICINE

## 2025-02-16 ENCOUNTER — HOSPITAL ENCOUNTER (EMERGENCY)
Facility: CLINIC | Age: 30
Discharge: HOME | End: 2025-02-17
Attending: EMERGENCY MEDICINE | Admitting: EMERGENCY MEDICINE

## 2025-02-16 DIAGNOSIS — F10.10 CHRONIC ALCOHOL ABUSE: ICD-10-CM

## 2025-02-16 DIAGNOSIS — J98.2 PNEUMOMEDIASTINUM (H): ICD-10-CM

## 2025-02-16 DIAGNOSIS — R11.2 NAUSEA AND VOMITING, UNSPECIFIED VOMITING TYPE: ICD-10-CM

## 2025-02-16 LAB
ALBUMIN SERPL BCG-MCNC: 4.9 G/DL (ref 3.5–5.2)
ALP SERPL-CCNC: 64 U/L (ref 40–150)
ALT SERPL W P-5'-P-CCNC: 51 U/L (ref 0–50)
ANION GAP SERPL CALCULATED.3IONS-SCNC: 17 MMOL/L (ref 7–15)
ANION GAP SERPL CALCULATED.3IONS-SCNC: 25 MMOL/L (ref 7–15)
AST SERPL W P-5'-P-CCNC: 35 U/L (ref 0–45)
AST SERPL W P-5'-P-CCNC: ABNORMAL U/L
ATRIAL RATE - MUSE: 109 BPM
BASOPHILS # BLD MANUAL: 0 10E3/UL (ref 0–0.2)
BASOPHILS NFR BLD MANUAL: 0 %
BILIRUB SERPL-MCNC: 0.6 MG/DL
BUN SERPL-MCNC: 12 MG/DL (ref 6–20)
BUN SERPL-MCNC: 9.2 MG/DL (ref 6–20)
CALCIUM SERPL-MCNC: 8.2 MG/DL (ref 8.8–10.4)
CALCIUM SERPL-MCNC: 9.5 MG/DL (ref 8.8–10.4)
CHLORIDE SERPL-SCNC: 100 MMOL/L (ref 98–107)
CHLORIDE SERPL-SCNC: 94 MMOL/L (ref 98–107)
CREAT SERPL-MCNC: 0.54 MG/DL (ref 0.51–0.95)
CREAT SERPL-MCNC: 0.67 MG/DL (ref 0.51–0.95)
D DIMER PPP FEU-MCNC: 1.22 UG/ML FEU (ref 0–0.5)
DIASTOLIC BLOOD PRESSURE - MUSE: NORMAL MMHG
EGFRCR SERPLBLD CKD-EPI 2021: >90 ML/MIN/1.73M2
EGFRCR SERPLBLD CKD-EPI 2021: >90 ML/MIN/1.73M2
EOSINOPHIL # BLD MANUAL: 0 10E3/UL (ref 0–0.7)
EOSINOPHIL NFR BLD MANUAL: 0 %
ERYTHROCYTE [DISTWIDTH] IN BLOOD BY AUTOMATED COUNT: 14.6 % (ref 10–15)
GLUCOSE SERPL-MCNC: 179 MG/DL (ref 70–99)
GLUCOSE SERPL-MCNC: 250 MG/DL (ref 70–99)
HCG SERPL QL: NEGATIVE
HCO3 SERPL-SCNC: 11 MMOL/L (ref 22–29)
HCO3 SERPL-SCNC: 15 MMOL/L (ref 22–29)
HCT VFR BLD AUTO: 42.3 % (ref 35–47)
HGB BLD-MCNC: 14.2 G/DL (ref 11.7–15.7)
HOLD SPECIMEN: NORMAL
INTERPRETATION ECG - MUSE: NORMAL
LIPASE SERPL-CCNC: 77 U/L (ref 13–60)
LYMPHOCYTES # BLD MANUAL: 0.7 10E3/UL (ref 0.8–5.3)
LYMPHOCYTES NFR BLD MANUAL: 3 %
MCH RBC QN AUTO: 31.3 PG (ref 26.5–33)
MCHC RBC AUTO-ENTMCNC: 33.6 G/DL (ref 31.5–36.5)
MCV RBC AUTO: 93 FL (ref 78–100)
MONOCYTES # BLD MANUAL: 0.9 10E3/UL (ref 0–1.3)
MONOCYTES NFR BLD MANUAL: 4 %
NEUTROPHILS # BLD MANUAL: 20.4 10E3/UL (ref 1.6–8.3)
NEUTROPHILS NFR BLD MANUAL: 93 %
P AXIS - MUSE: 49 DEGREES
PLAT MORPH BLD: ABNORMAL
PLATELET # BLD AUTO: 382 10E3/UL (ref 150–450)
POTASSIUM SERPL-SCNC: 4.4 MMOL/L (ref 3.4–5.3)
POTASSIUM SERPL-SCNC: 5.6 MMOL/L (ref 3.4–5.3)
PR INTERVAL - MUSE: 94 MS
PROT SERPL-MCNC: 8.6 G/DL (ref 6.4–8.3)
QRS DURATION - MUSE: 84 MS
QT - MUSE: 334 MS
QTC - MUSE: 449 MS
R AXIS - MUSE: 63 DEGREES
RBC # BLD AUTO: 4.53 10E6/UL (ref 3.8–5.2)
RBC MORPH BLD: ABNORMAL
SODIUM SERPL-SCNC: 130 MMOL/L (ref 135–145)
SODIUM SERPL-SCNC: 132 MMOL/L (ref 135–145)
SYSTOLIC BLOOD PRESSURE - MUSE: NORMAL MMHG
T AXIS - MUSE: 57 DEGREES
TROPONIN T SERPL HS-MCNC: 7 NG/L
TROPONIN T SERPL HS-MCNC: 8 NG/L
VENTRICULAR RATE- MUSE: 109 BPM
WBC # BLD AUTO: 21.9 10E3/UL (ref 4–11)

## 2025-02-16 PROCEDURE — 99284 EMERGENCY DEPT VISIT MOD MDM: CPT | Performed by: EMERGENCY MEDICINE

## 2025-02-16 PROCEDURE — 82565 ASSAY OF CREATININE: CPT | Performed by: EMERGENCY MEDICINE

## 2025-02-16 PROCEDURE — 250N000011 HC RX IP 250 OP 636: Mod: JW | Performed by: EMERGENCY MEDICINE

## 2025-02-16 PROCEDURE — 71270 CT THORAX DX C-/C+: CPT

## 2025-02-16 PROCEDURE — 96375 TX/PRO/DX INJ NEW DRUG ADDON: CPT

## 2025-02-16 PROCEDURE — 36415 COLL VENOUS BLD VENIPUNCTURE: CPT | Performed by: EMERGENCY MEDICINE

## 2025-02-16 PROCEDURE — 82040 ASSAY OF SERUM ALBUMIN: CPT | Performed by: EMERGENCY MEDICINE

## 2025-02-16 PROCEDURE — 84484 ASSAY OF TROPONIN QUANT: CPT | Performed by: EMERGENCY MEDICINE

## 2025-02-16 PROCEDURE — 74177 CT ABD & PELVIS W/CONTRAST: CPT

## 2025-02-16 PROCEDURE — 85379 FIBRIN DEGRADATION QUANT: CPT | Performed by: EMERGENCY MEDICINE

## 2025-02-16 PROCEDURE — 85007 BL SMEAR W/DIFF WBC COUNT: CPT | Performed by: EMERGENCY MEDICINE

## 2025-02-16 PROCEDURE — 258N000003 HC RX IP 258 OP 636: Performed by: EMERGENCY MEDICINE

## 2025-02-16 PROCEDURE — 93010 ELECTROCARDIOGRAM REPORT: CPT | Performed by: EMERGENCY MEDICINE

## 2025-02-16 PROCEDURE — 96374 THER/PROPH/DIAG INJ IV PUSH: CPT | Mod: 59

## 2025-02-16 PROCEDURE — 83690 ASSAY OF LIPASE: CPT | Performed by: EMERGENCY MEDICINE

## 2025-02-16 PROCEDURE — 250N000011 HC RX IP 250 OP 636: Performed by: EMERGENCY MEDICINE

## 2025-02-16 PROCEDURE — 93005 ELECTROCARDIOGRAM TRACING: CPT

## 2025-02-16 PROCEDURE — 71275 CT ANGIOGRAPHY CHEST: CPT

## 2025-02-16 PROCEDURE — 250N000009 HC RX 250: Performed by: EMERGENCY MEDICINE

## 2025-02-16 PROCEDURE — 84703 CHORIONIC GONADOTROPIN ASSAY: CPT | Performed by: EMERGENCY MEDICINE

## 2025-02-16 PROCEDURE — 99285 EMERGENCY DEPT VISIT HI MDM: CPT | Mod: 25

## 2025-02-16 PROCEDURE — 96361 HYDRATE IV INFUSION ADD-ON: CPT

## 2025-02-16 PROCEDURE — 85027 COMPLETE CBC AUTOMATED: CPT | Performed by: EMERGENCY MEDICINE

## 2025-02-16 PROCEDURE — 80051 ELECTROLYTE PANEL: CPT | Performed by: EMERGENCY MEDICINE

## 2025-02-16 RX ORDER — HYDROMORPHONE HYDROCHLORIDE 1 MG/ML
0.3 INJECTION, SOLUTION INTRAMUSCULAR; INTRAVENOUS; SUBCUTANEOUS ONCE
Status: COMPLETED | OUTPATIENT
Start: 2025-02-16 | End: 2025-02-16

## 2025-02-16 RX ORDER — IOPAMIDOL 755 MG/ML
68 INJECTION, SOLUTION INTRAVASCULAR ONCE
Status: COMPLETED | OUTPATIENT
Start: 2025-02-16 | End: 2025-02-16

## 2025-02-16 RX ORDER — ONDANSETRON 2 MG/ML
4 INJECTION INTRAMUSCULAR; INTRAVENOUS ONCE
Status: COMPLETED | OUTPATIENT
Start: 2025-02-16 | End: 2025-02-16

## 2025-02-16 RX ORDER — ONDANSETRON 4 MG/1
4 TABLET, ORALLY DISINTEGRATING ORAL ONCE
Status: COMPLETED | OUTPATIENT
Start: 2025-02-16 | End: 2025-02-16

## 2025-02-16 RX ORDER — SODIUM CHLORIDE 9 MG/ML
INJECTION, SOLUTION INTRAVENOUS CONTINUOUS
Status: DISCONTINUED | OUTPATIENT
Start: 2025-02-16 | End: 2025-02-17 | Stop reason: HOSPADM

## 2025-02-16 RX ORDER — DIATRIZOATE MEGLUMINE AND DIATRIZOATE SODIUM 660; 100 MG/ML; MG/ML
120 SOLUTION ORAL; RECTAL ONCE
Status: COMPLETED | OUTPATIENT
Start: 2025-02-16 | End: 2025-02-16

## 2025-02-16 RX ORDER — LORAZEPAM 2 MG/ML
0.5 INJECTION INTRAMUSCULAR ONCE
Status: COMPLETED | OUTPATIENT
Start: 2025-02-16 | End: 2025-02-16

## 2025-02-16 RX ADMIN — HYDROMORPHONE HYDROCHLORIDE 0.3 MG: 1 INJECTION, SOLUTION INTRAMUSCULAR; INTRAVENOUS; SUBCUTANEOUS at 22:28

## 2025-02-16 RX ADMIN — IOPAMIDOL 68 ML: 755 INJECTION, SOLUTION INTRAVENOUS at 18:18

## 2025-02-16 RX ADMIN — SODIUM CHLORIDE 1000 ML: 0.9 INJECTION, SOLUTION INTRAVENOUS at 17:16

## 2025-02-16 RX ADMIN — ONDANSETRON 4 MG: 2 INJECTION, SOLUTION INTRAMUSCULAR; INTRAVENOUS at 17:16

## 2025-02-16 RX ADMIN — SODIUM CHLORIDE 95 ML: 9 INJECTION, SOLUTION INTRAVENOUS at 18:18

## 2025-02-16 RX ADMIN — DIATRIZOATE MEGLUMINE AND DIATRIZOATE SODIUM 120 ML: 660; 100 SOLUTION ORAL; RECTAL at 23:18

## 2025-02-16 RX ADMIN — LORAZEPAM 0.5 MG: 2 INJECTION INTRAMUSCULAR; INTRAVENOUS at 23:58

## 2025-02-16 RX ADMIN — SODIUM CHLORIDE: 9 INJECTION, SOLUTION INTRAVENOUS at 21:50

## 2025-02-16 RX ADMIN — SODIUM CHLORIDE 1000 ML: 9 INJECTION, SOLUTION INTRAVENOUS at 18:52

## 2025-02-16 RX ADMIN — ONDANSETRON 4 MG: 4 TABLET, ORALLY DISINTEGRATING ORAL at 16:38

## 2025-02-16 ASSESSMENT — COLUMBIA-SUICIDE SEVERITY RATING SCALE - C-SSRS
2. HAVE YOU ACTUALLY HAD ANY THOUGHTS OF KILLING YOURSELF IN THE PAST MONTH?: NO
1. IN THE PAST MONTH, HAVE YOU WISHED YOU WERE DEAD OR WISHED YOU COULD GO TO SLEEP AND NOT WAKE UP?: NO
6. HAVE YOU EVER DONE ANYTHING, STARTED TO DO ANYTHING, OR PREPARED TO DO ANYTHING TO END YOUR LIFE?: NO

## 2025-02-16 ASSESSMENT — ACTIVITIES OF DAILY LIVING (ADL)
ADLS_ACUITY_SCORE: 41

## 2025-02-16 NOTE — ED PROVIDER NOTES
.  History     Chief Complaint   Patient presents with    Nausea & Vomiting     HPI  Erika Jasmine is a 29 year old female with a past medical history significant for anxiety alcohol use disorder who presents emergency department complaining of nausea and vomiting.  Patient states he was in usual state of health until this morning when she began vomiting and vomited multiple times unable to keep anything down.  Patient states she was feeling fine before going to bed.  Denies any recent illness.  Patient states she began having some shortness of breath especially with exertion this afternoon and therefore came in for further evaluation and care.  Denies any recent travel and denies any significant alcohol or drug abuse recently.  Denies any recent travel or trauma.  Has not had leg swelling calf pain has not had a rash no diarrhea with this has had normal stools.  Thinks she might have seen a few tinges of blood in the vomit at the end.  Has not had significant coughing although she is gagging and coughing after vomiting.  After reviewing chart further patient has had some alcohol use issues and I discussed this with her she states she is in outpatient treatment and drink a pint of alcohol last night.    Allergies:  No Known Allergies    Problem List:    Patient Active Problem List    Diagnosis Date Noted    Tobacco abuse 02/16/2017     Priority: Medium    Anxiety 02/16/2017     Priority: Medium    Female infertility 02/16/2017     Priority: Medium        Past Medical History:    Past Medical History:   Diagnosis Date    NO ACTIVE PROBLEMS        Past Surgical History:    Past Surgical History:   Procedure Laterality Date    ORTHOPEDIC SURGERY      L foot surgery at the age of 7 yrs old       Family History:    Family History   Problem Relation Age of Onset    Other Cancer Paternal Grandmother     Other Cancer Paternal Grandfather        Social History:  Marital Status:  Single [1]  Social History     Tobacco Use     "Smoking status: Every Day     Current packs/day: 0.50     Average packs/day: 0.5 packs/day for 10.0 years (5.0 ttl pk-yrs)     Types: Cigarettes   Substance Use Topics    Alcohol use: Yes     Comment: OCC    Drug use: No        Medications:    clotrimazole (LOTRIMIN) 1 % external cream  hydrOXYzine (ATARAX) 25 MG tablet  hydrOXYzine HCl (ATARAX) 25 MG tablet  nicotine (NICODERM CQ) 14 MG/24HR 24 hr patch  nicotine (NICODERM CQ) 7 MG/24HR 24 hr patch          Review of Systems  As per HPI  Physical Exam   BP: 130/87  Pulse: 120  Temp: 97.1  F (36.2  C)  Resp: 16  Height: 162.6 cm (5' 4\")  Weight: 61.2 kg (135 lb)  SpO2: 98 %      Physical Exam  Vitals and nursing note reviewed.   Constitutional:       Appearance: Normal appearance. She is not ill-appearing, toxic-appearing or diaphoretic.      Comments: Generalized distress secondary to nausea.   HENT:      Head: Normocephalic and atraumatic.      Nose: Nose normal.      Mouth/Throat:      Mouth: Mucous membranes are moist.      Pharynx: Oropharynx is clear.   Eyes:      Conjunctiva/sclera: Conjunctivae normal.   Cardiovascular:      Rate and Rhythm: Normal rate and regular rhythm.      Pulses: Normal pulses.      Heart sounds: Normal heart sounds. No murmur heard.  Pulmonary:      Effort: Pulmonary effort is normal.      Breath sounds: No stridor. No wheezing or rhonchi.      Comments: Breath sounds are decreased at bases bilaterally.  Abdominal:      General: Abdomen is flat.      Palpations: Abdomen is soft.      Comments: Mild tenderness palpation in the epigastric region.  No guarding or rebound present bowel sounds are slightly hyperactive.  There are no masses or hernia noted.   Musculoskeletal:         General: Normal range of motion.      Cervical back: Normal range of motion and neck supple.      Right lower leg: No edema.      Left lower leg: No edema.   Skin:     General: Skin is warm and dry.      Capillary Refill: Capillary refill takes less than 2 " seconds.      Findings: No rash.   Neurological:      General: No focal deficit present.      Mental Status: She is alert and oriented to person, place, and time.      Sensory: No sensory deficit.      Motor: No weakness.      Coordination: Coordination normal.   Psychiatric:         Mood and Affect: Mood normal.         ED Course        Procedures              EKG Interpretation:      Interpreted by Truong Malloy MD:  Rhythm: sinus tachycardia  Rate: 100-110  Axis: Normal  Ectopy: none  Conduction: Shortened IA interval otherwise normal  ST Segments/ T Waves: Non-specific ST-T wave changes  Q Waves: none  Comparison to prior: Unchanged from 11/17/2023 except no tachycardia and then    Clinical Impression: Sinus tachycardia with shortened IA interval and nonspecific ST-T wave abnormalities.    Critical Care time:  none     None         Labs Ordered and Resulted from Time of ED Arrival to Time of ED Departure   COMPREHENSIVE METABOLIC PANEL - Abnormal       Result Value    Sodium 130 (*)     Potassium 5.6 (*)     Carbon Dioxide (CO2) 11 (*)     Anion Gap 25 (*)     Urea Nitrogen 12.0      Creatinine 0.67      GFR Estimate >90      Calcium 9.5      Chloride 94 (*)     Glucose 250 (*)     Alkaline Phosphatase 64      AST        ALT 51 (*)     Protein Total 8.6 (*)     Albumin 4.9      Bilirubin Total 0.6     LIPASE - Abnormal    Lipase 77 (*)    D DIMER QUANTITATIVE - Abnormal    D-Dimer Quantitative 1.22 (*)    CBC WITH PLATELETS AND DIFFERENTIAL - Abnormal    WBC Count 21.9 (*)     RBC Count 4.53      Hemoglobin 14.2      Hematocrit 42.3      MCV 93      MCH 31.3      MCHC 33.6      RDW 14.6      Platelet Count 382     MANUAL DIFFERENTIAL - Abnormal    % Neutrophils 93      % Lymphocytes 3      % Monocytes 4      % Eosinophils 0      % Basophils 0      Absolute Neutrophils 20.4 (*)     Absolute Lymphocytes 0.7 (*)     Absolute Monocytes 0.9      Absolute Eosinophils 0.0      Absolute Basophils 0.0      RBC  Morphology Confirmed RBC Indices      Platelet Assessment        Value: Automated Count Confirmed. Platelet morphology is normal.   BASIC METABOLIC PANEL - Abnormal    Sodium 132 (*)     Potassium 4.4      Chloride 100      Carbon Dioxide (CO2) 15 (*)     Anion Gap 17 (*)     Urea Nitrogen 9.2      Creatinine 0.54      GFR Estimate >90      Calcium 8.2 (*)     Glucose 179 (*)    TROPONIN T, HIGH SENSITIVITY - Normal    Troponin T, High Sensitivity 7     HCG QUALITATIVE PREGNANCY - Normal    hCG Serum Qualitative Negative     AST - Normal    AST 35     TROPONIN T, HIGH SENSITIVITY - Normal    Troponin T, High Sensitivity 8        Results for orders placed or performed during the hospital encounter of 02/16/25   CT Chest (PE) Abdomen Pelvis w Contrast    Narrative    EXAM: CT CHEST PE ABDOMEN PELVIS W CONTRAST  LOCATION: [facility]  DATE: 2/16/2025    INDICATION: Patient with shortness of breath with exertion vomiting elevated lipase and abdominal pain.  COMPARISON: None.  TECHNIQUE: CT chest pulmonary angiogram and routine CT abdomen pelvis with IV contrast. Arterial phase through the chest and venous phase through the abdomen and pelvis. Multiplanar reformats and MIP reconstructions were performed. Dose reduction   techniques were used.   CONTRAST: 68 mL Isovue 370  LIMITATIONS: Large FOV      FINDINGS:  ANGIOGRAM CHEST: No evidence of pulmonary embolus. Exam mildly limited by contrast bolus/mixing artifact. Thoracic aorta is negative for dissection. No CT evidence of right heart strain.     LUNGS AND PLEURA: 2 mm peripheral lingular nodule, image 149 series 7. No acute infiltrates or effusions.    MEDIASTINUM/AXILLAE: Moderate pneumomediastinum predominantly in the anterior mediastinum extending superiorly into the neck, with some gas tracking into the anterior left chest wall and left-sided extrapleural space..    CORONARY ARTERY CALCIFICATION: None.    HEPATOBILIARY: Diffuse hepatic  steatosis.    PANCREAS: Unremarkable. Specifically, no peripancreatic fluid collections or inflammation.    SPLEEN: Small splenic cyst or hemangioma    ADRENAL GLANDS: Unremarkable    KIDNEYS/BLADDER: Unremarkable    BOWEL: Normal caliber appendix.    LYMPH NODES: No significant retroperitoneal adenopathy    VASCULATURE: No abdominal aortic aneurysm. Patent portal vein.    PELVIC ORGANS: No free fluid.    MUSCULOSKELETAL: No acute bony abnormalities.      Impression    IMPRESSION:  1.  Pneumomediastinum.  2.  Hepatic steatosis.  3.  No evidence of pulmonary embolus.    Critical Result: Pneumomediastinum    Finding was identified on 2/16/2025 6:47 PM CST.    Dr. Malloy was contacted by me on 2/16/2025 6:47 PM CST and verbalized understanding of the critical result.      CT Esophagram without & with Contrast    Narrative    EXAM: CT ESOPHAGRAM WITHOUT and WITH CONTRAST  LOCATION: [facility]  DATE: 2/16/2025    INDICATION: No pneumomediastinum rule out esophageal perforation.  COMPARISON: None.  TECHNIQUE: CT chest with oral contrast. Multiplanar reformats were obtained. Dose reduction techniques were used.    CONTRAST: 120 ml oral gastrgrafin    FINDINGS:   LUNGS AND PLEURA: No pneumothorax or pleural effusions.    MEDIASTINUM/AXILLAE: Pneumomediastinum redemonstrated extending into the neck and left anterior chest/axilla, similar to prior. No extraluminal contrast material identified following oral contrast ingestion.    CORONARY ARTERY CALCIFICATION:  None    UPPER ABDOMEN: Hepatic steatosis.    MUSCULOSKELETAL: No acute bony abnormalities      Impression    IMPRESSION:   1.  Pneumomediastinum.  2.  No CT evidence of oral contrast leak in the chest.          Medications   sodium chloride 0.9% BOLUS 1,000 mL (0 mLs Intravenous Stopped 2/16/25 1812)   ondansetron (ZOFRAN) injection 4 mg (4 mg Intravenous $Given 2/16/25 1716)   ondansetron (ZOFRAN ODT) ODT tab 4 mg (4 mg Oral $Given 2/16/25  1638)   sodium chloride 0.9% BOLUS 1,000 mL (0 mLs Intravenous Stopped 2/16/25 1930)   iopamidol (ISOVUE-370) solution 68 mL (68 mLs Intravenous $Given 2/16/25 1818)   sodium chloride 0.9 % bag 500mL for CT scan flush use (95 mLs Intravenous $Given 2/16/25 1818)   HYDROmorphone (PF) (DILAUDID) injection 0.3 mg (0.3 mg Intravenous $Given 2/16/25 2228)   diatrizoate meglumine-sodium (GASTROGRAFIN/GASTROVIEW) 66-10 % solution 120 mL (120 mLs Oral $Given 2/16/25 2318)   LORazepam (ATIVAN) injection 0.5 mg (0.5 mg Intravenous $Given 2/16/25 2358)   LORazepam (ATIVAN) injection 0.5 mg (0.5 mg Intravenous $Given 2/17/25 0135)   sodium chloride 0.9% BOLUS 500 mL (0 mLs Intravenous Stopped 2/17/25 0325)       Assessments & Plan (with Medical Decision Making) records reviewed including past medical history medications and allergies.  Office visit on 12/19/2024 for follow-up of with r refills of naltrexone trazodone hydroxyzine patient at that time and recently been treated for alcohol addiction.  And is in an outpatient program at this time.  Labs were obtained patient was given Zofran and IV fluids.  Independently reviewed and interpreted labs.  Patient's white count was significantly elevated at 21.9 hemoglobin 14.2 platelet count 382.  There was a left shift.  Comprehensive metabolic panel significant for a sodium of 130 potassium is elevated 5.6, gap is 25 chloride 94 glucose 250.  Lipase was 77 D-dimer 1.22.  IV fluids were continued.  Pregnancy test was negative.  Due to patient's elevated D-dimer and lipase of 77 and a CT PE protocol with abdomen pelvis was obtained.  I was contacted by the radiologist and told that patient had a moderate pneumomediastinum predominantly in the anterior mediastinum extending superiorly into the neck with some gas tracking into the anterior left chest wall and left-sided extrapleural space.  No other acute abnormality noted the appendix.  Normal.  The pancreas appeared normal.  I went  back and listen the patient's lungs and did feel some mild crepitance at this time in the anterior chest and heard the Hamman's crunch when listening to the heart.  At this time I contacted Dr. Storm Chappell thoracic surgery at [facility].  He recommended getting a esophageal study to rule out any leak and if this was clear patient could be discharged without antibiotics if able to tolerate p.o. fluids and food.  This study was ordered.  It again revealed similar pneumomediastinum with no evidence of leakage of the esophagus.  Patient to be given some pain medication and received 2 L of fluid.  I repeated a basic metabolic panel status sodium 132 with a Of 15 anion gap improved at 17 and glucose was 179.  Initial troponin was 7 and second troponin was 8.  Findings discussed in detail with patient.  We will give her a trial of p.o. fluids and see if she can eat something.  She will be observed for several hours and then if tolerating these events patient will be to be discharged per the thoracic surgeon.  Patient continues to be a bit tachycardic and I have some concern that she may not be completely done as how much alcohol she been drinking.  She is not tremulous but heart rate is increased she has had 2 and half liters of fluid and I am going to give her some Ativan and see if this drops is down she will be observed overnight and if heart rate and GI symptoms are improved patient should be able be discharged.  Patient was signed out to Dr. Mcdaniel to monitor patient's tolerance of p.o. challenge as well as her tachycardia as she is getting another fluid bolus and if these both resolved and she is tolerating p.o. well with decreased heart rate she will be able to discharge home with follow-up with primary care.  Did not want detox at this time.     I have reviewed the nursing notes.    I have reviewed the findings, diagnosis, plan and need for follow up with the patient.         Discharge Medication List as of 2/17/2025   6:08 AM        START taking these medications    Details   ondansetron (ZOFRAN ODT) 4 MG ODT tab Take 1 tablet (4 mg) by mouth every 8 hours as needed for nausea., Disp-10 tablet, R-0, [facility]             Final diagnoses:   Nausea and vomiting, unspecified vomiting type   Pneumomediastinum (H)   Chronic alcohol abuse       2/16/2025   [facility] EMERGENCY DEPT       Truong Malloy MD  02/18/25 0877

## 2025-02-16 NOTE — ED NOTES
ANS contacted to place U/S IV. U/S IV attempted by charge RN, as well as writer. ANS will come to bedside when he is available.

## 2025-02-16 NOTE — ED TRIAGE NOTES
N/V started today. Endorses shortness of breath and chest pain as well.  in triage.      Triage Assessment (Adult)       Row Name 02/16/25 1539          Triage Assessment    Airway WDL WDL        Respiratory WDL    Respiratory WDL WDL        Skin Circulation/Temperature WDL    Skin Circulation/Temperature WDL WDL        Cardiac WDL    Cardiac WDL WDL        Peripheral/Neurovascular WDL    Peripheral Neurovascular WDL WDL        Cognitive/Neuro/Behavioral WDL    Cognitive/Neuro/Behavioral WDL WDL

## 2025-02-17 VITALS
HEIGHT: 64 IN | HEART RATE: 94 BPM | SYSTOLIC BLOOD PRESSURE: 110 MMHG | WEIGHT: 135 LBS | BODY MASS INDEX: 23.05 KG/M2 | DIASTOLIC BLOOD PRESSURE: 76 MMHG | RESPIRATION RATE: 22 BRPM | OXYGEN SATURATION: 95 % | TEMPERATURE: 97.1 F

## 2025-02-17 PROCEDURE — 96361 HYDRATE IV INFUSION ADD-ON: CPT

## 2025-02-17 PROCEDURE — 250N000011 HC RX IP 250 OP 636: Performed by: EMERGENCY MEDICINE

## 2025-02-17 PROCEDURE — 258N000003 HC RX IP 258 OP 636: Performed by: EMERGENCY MEDICINE

## 2025-02-17 PROCEDURE — 96376 TX/PRO/DX INJ SAME DRUG ADON: CPT

## 2025-02-17 RX ORDER — ONDANSETRON 4 MG/1
4 TABLET, ORALLY DISINTEGRATING ORAL EVERY 8 HOURS PRN
Qty: 10 TABLET | Refills: 0 | Status: SHIPPED | OUTPATIENT
Start: 2025-02-17

## 2025-02-17 RX ORDER — LORAZEPAM 2 MG/ML
0.5 INJECTION INTRAMUSCULAR ONCE
Status: COMPLETED | OUTPATIENT
Start: 2025-02-17 | End: 2025-02-17

## 2025-02-17 RX ORDER — HYDROMORPHONE HYDROCHLORIDE 1 MG/ML
0.3 INJECTION, SOLUTION INTRAMUSCULAR; INTRAVENOUS; SUBCUTANEOUS ONCE
Status: DISCONTINUED | OUTPATIENT
Start: 2025-02-17 | End: 2025-02-17

## 2025-02-17 RX ADMIN — LORAZEPAM 0.5 MG: 2 INJECTION INTRAMUSCULAR; INTRAVENOUS at 01:35

## 2025-02-17 RX ADMIN — SODIUM CHLORIDE 500 ML: 9 INJECTION, SOLUTION INTRAVENOUS at 02:18

## 2025-02-17 ASSESSMENT — LIFESTYLE VARIABLES
ORIENTATION AND CLOUDING OF SENSORIUM: ORIENTED AND CAN DO SERIAL ADDITIONS
AGITATION: NORMAL ACTIVITY
ANXIETY: NO ANXIETY, AT EASE
VISUAL DISTURBANCES: NOT PRESENT
PAROXYSMAL SWEATS: NO SWEAT VISIBLE
NAUSEA AND VOMITING: NO NAUSEA AND NO VOMITING
TOTAL SCORE: 0
TREMOR: NO TREMOR
AUDITORY DISTURBANCES: NOT PRESENT
HEADACHE, FULLNESS IN HEAD: NOT PRESENT

## 2025-02-17 ASSESSMENT — ACTIVITIES OF DAILY LIVING (ADL)
ADLS_ACUITY_SCORE: 41

## 2025-02-17 NOTE — ED PROVIDER NOTES
"     Emergency Department Patient Sign-out       Brief HPI:  This is a 29 year old female signed out to me by Dr. Malloy .  See initial ED Provider note for details of the presentation.            Significant Events prior to my assuming care: none      Exam:   Patient Vitals for the past 24 hrs:   BP Temp Temp src Pulse Resp SpO2 Height Weight   02/17/25 0500 110/76 -- -- 94 22 95 % -- --   02/17/25 0255 116/80 -- -- 114 13 (!) 87 % -- --   02/17/25 0230 118/83 -- -- 111 12 94 % -- --   02/17/25 0155 116/79 -- -- 118 10 97 % -- --   02/17/25 0100 126/79 -- -- 116 -- 98 % -- --   02/17/25 0001 121/86 -- -- 101 -- 98 % -- --   02/16/25 2231 132/84 -- -- 117 15 96 % -- --   02/16/25 2201 120/88 -- -- 117 19 96 % -- --   02/16/25 2150 -- -- -- 115 10 97 % -- --   02/16/25 2131 118/76 -- -- 112 19 98 % -- --   02/16/25 2101 121/77 -- -- 114 15 98 % -- --   02/16/25 2050 -- -- -- (!) 126 (!) 0 98 % -- --   02/16/25 1955 125/85 -- -- 110 (!) 7 98 % -- --   02/16/25 1855 128/86 -- -- 93 13 98 % -- --   02/16/25 1850 129/85 -- -- 113 19 99 % -- --   02/16/25 1730 122/87 -- -- 97 21 94 % -- --   02/16/25 1630 -- -- -- 98 22 100 % -- --   02/16/25 1538 130/87 97.1  F (36.2  C) Oral 120 16 98 % 1.626 m (5' 4\") 61.2 kg (135 lb)           ED RESULTS:   Results for orders placed or performed during the hospital encounter of 02/16/25 (from the past 24 hours)   EKG 12-lead, tracing only     Status: None    Collection Time: 02/16/25  3:51 PM   Result Value Ref Range    Systolic Blood Pressure  mmHg    Diastolic Blood Pressure  mmHg    Ventricular Rate 109 BPM    Atrial Rate 109 BPM    OH Interval 94 ms    QRS Duration 84 ms     ms    QTc 449 ms    P Axis 49 degrees    R AXIS 63 degrees    T Axis 57 degrees    Interpretation ECG       Sinus tachycardia with short OH  Otherwise normal ECG  No previous ECGs available  Confirmed by SEE ED PROVIDER NOTE FOR, ECG INTERPRETATION ([id number]),  Laura Nuñez ([id number]) on 2/16/2025 " 9:57:48 PM     CBC with platelets differential     Status: Abnormal    Collection Time: 02/16/25  5:17 PM    Narrative    The following orders were created for panel order CBC with platelets differential.  Procedure                               Abnormality         Status                     ---------                               -----------         ------                     CBC with platelets and d...[[id number]]  Abnormal            Final result               Manual Differential[[id number]]          Abnormal            Final result                 Please view results for these tests on the individual orders.   Comprehensive metabolic panel     Status: Abnormal    Collection Time: 02/16/25  5:17 PM   Result Value Ref Range    Sodium 130 (L) 135 - 145 mmol/L    Potassium 5.6 (H) 3.4 - 5.3 mmol/L    Carbon Dioxide (CO2) 11 (L) 22 - 29 mmol/L    Anion Gap 25 (H) 7 - 15 mmol/L    Urea Nitrogen 12.0 6.0 - 20.0 mg/dL    Creatinine 0.67 0.51 - 0.95 mg/dL    GFR Estimate >90 >60 mL/min/1.73m2    Calcium 9.5 8.8 - 10.4 mg/dL    Chloride 94 (L) 98 - 107 mmol/L    Glucose 250 (H) 70 - 99 mg/dL    Alkaline Phosphatase 64 40 - 150 U/L    AST      ALT 51 (H) 0 - 50 U/L    Protein Total 8.6 (H) 6.4 - 8.3 g/dL    Albumin 4.9 3.5 - 5.2 g/dL    Bilirubin Total 0.6 <=1.2 mg/dL   Lipase     Status: Abnormal    Collection Time: 02/16/25  5:17 PM   Result Value Ref Range    Lipase 77 (H) 13 - 60 U/L   Troponin T, High Sensitivity     Status: Normal    Collection Time: 02/16/25  5:17 PM   Result Value Ref Range    Troponin T, High Sensitivity 7 <=14 ng/L   D dimer quantitative     Status: Abnormal    Collection Time: 02/16/25  5:17 PM   Result Value Ref Range    D-Dimer Quantitative 1.22 (H) 0.00 - 0.50 ug/mL FEU    Narrative    This D-dimer assay is intended for use in conjunction with a clinical pretest probability assessment model to exclude pulmonary embolism (PE) and deep venous thrombosis (DVT) in outpatients suspected of PE or  DVT. The cut-off value is 0.50 ug/mL FEU.   CBC with platelets and differential     Status: Abnormal    Collection Time: 02/16/25  5:17 PM   Result Value Ref Range    WBC Count 21.9 (H) 4.0 - 11.0 10e3/uL    RBC Count 4.53 3.80 - 5.20 10e6/uL    Hemoglobin 14.2 11.7 - 15.7 g/dL    Hematocrit 42.3 35.0 - 47.0 %    MCV 93 78 - 100 fL    MCH 31.3 26.5 - 33.0 pg    MCHC 33.6 31.5 - 36.5 g/dL    RDW 14.6 10.0 - 15.0 %    Platelet Count 382 150 - 450 10e3/uL   Manual Differential     Status: Abnormal    Collection Time: 02/16/25  5:17 PM   Result Value Ref Range    % Neutrophils 93 %    % Lymphocytes 3 %    % Monocytes 4 %    % Eosinophils 0 %    % Basophils 0 %    Absolute Neutrophils 20.4 (H) 1.6 - 8.3 10e3/uL    Absolute Lymphocytes 0.7 (L) 0.8 - 5.3 10e3/uL    Absolute Monocytes 0.9 0.0 - 1.3 10e3/uL    Absolute Eosinophils 0.0 0.0 - 0.7 10e3/uL    Absolute Basophils 0.0 0.0 - 0.2 10e3/uL    RBC Morphology Confirmed RBC Indices     Platelet Assessment  Automated Count Confirmed. Platelet morphology is normal.     Automated Count Confirmed. Platelet morphology is normal.   HCG qualitative pregnancy (blood)     Status: Normal    Collection Time: 02/16/25  5:42 PM   Result Value Ref Range    hCG Serum Qualitative Negative Negative   Ash Draw     Status: None    Collection Time: 02/16/25  5:42 PM    Narrative    The following orders were created for panel order Ash Draw.  Procedure                               Abnormality         Status                     ---------                               -----------         ------                     Extra Green Top (Lithium...[[id number]]                      Final result                 Please view results for these tests on the individual orders.   Extra Green Top (Lithium Heparin) Tube     Status: None    Collection Time: 02/16/25  5:42 PM   Result Value Ref Range    Hold Specimen JIC    AST     Status: Normal    Collection Time: 02/16/25  5:42 PM   Result Value Ref  Range    AST 35 0 - 45 U/L   CT Chest (PE) Abdomen Pelvis w Contrast     Status: None    Collection Time: 02/16/25  6:38 PM    Narrative    EXAM: CT CHEST PE ABDOMEN PELVIS W CONTRAST  LOCATION: [facility]  DATE: 2/16/2025    INDICATION: Patient with shortness of breath with exertion vomiting elevated lipase and abdominal pain.  COMPARISON: None.  TECHNIQUE: CT chest pulmonary angiogram and routine CT abdomen pelvis with IV contrast. Arterial phase through the chest and venous phase through the abdomen and pelvis. Multiplanar reformats and MIP reconstructions were performed. Dose reduction   techniques were used.   CONTRAST: 68 mL Isovue 370  LIMITATIONS: Large FOV      FINDINGS:  ANGIOGRAM CHEST: No evidence of pulmonary embolus. Exam mildly limited by contrast bolus/mixing artifact. Thoracic aorta is negative for dissection. No CT evidence of right heart strain.     LUNGS AND PLEURA: 2 mm peripheral lingular nodule, image 149 series 7. No acute infiltrates or effusions.    MEDIASTINUM/AXILLAE: Moderate pneumomediastinum predominantly in the anterior mediastinum extending superiorly into the neck, with some gas tracking into the anterior left chest wall and left-sided extrapleural space..    CORONARY ARTERY CALCIFICATION: None.    HEPATOBILIARY: Diffuse hepatic steatosis.    PANCREAS: Unremarkable. Specifically, no peripancreatic fluid collections or inflammation.    SPLEEN: Small splenic cyst or hemangioma    ADRENAL GLANDS: Unremarkable    KIDNEYS/BLADDER: Unremarkable    BOWEL: Normal caliber appendix.    LYMPH NODES: No significant retroperitoneal adenopathy    VASCULATURE: No abdominal aortic aneurysm. Patent portal vein.    PELVIC ORGANS: No free fluid.    MUSCULOSKELETAL: No acute bony abnormalities.      Impression    IMPRESSION:  1.  Pneumomediastinum.  2.  Hepatic steatosis.  3.  No evidence of pulmonary embolus.    Critical Result: Pneumomediastinum    Finding was identified  on 2/16/2025 6:47 PM CST.    Dr. Malloy was contacted by me on 2/16/2025 6:47 PM CST and verbalized understanding of the critical result.      Troponin T, High Sensitivity     Status: Normal    Collection Time: 02/16/25  7:36 PM   Result Value Ref Range    Troponin T, High Sensitivity 8 <=14 ng/L   Basic metabolic panel     Status: Abnormal    Collection Time: 02/16/25  7:36 PM   Result Value Ref Range    Sodium 132 (L) 135 - 145 mmol/L    Potassium 4.4 3.4 - 5.3 mmol/L    Chloride 100 98 - 107 mmol/L    Carbon Dioxide (CO2) 15 (L) 22 - 29 mmol/L    Anion Gap 17 (H) 7 - 15 mmol/L    Urea Nitrogen 9.2 6.0 - 20.0 mg/dL    Creatinine 0.54 0.51 - 0.95 mg/dL    GFR Estimate >90 >60 mL/min/1.73m2    Calcium 8.2 (L) 8.8 - 10.4 mg/dL    Glucose 179 (H) 70 - 99 mg/dL   CT Esophagram without & with Contrast     Status: None    Collection Time: 02/16/25 11:19 PM    Narrative    EXAM: CT ESOPHAGRAM WITHOUT and WITH CONTRAST  LOCATION: [facility]  DATE: 2/16/2025    INDICATION: No pneumomediastinum rule out esophageal perforation.  COMPARISON: None.  TECHNIQUE: CT chest with oral contrast. Multiplanar reformats were obtained. Dose reduction techniques were used.    CONTRAST: 120 ml oral gastrgrafin    FINDINGS:   LUNGS AND PLEURA: No pneumothorax or pleural effusions.    MEDIASTINUM/AXILLAE: Pneumomediastinum redemonstrated extending into the neck and left anterior chest/axilla, similar to prior. No extraluminal contrast material identified following oral contrast ingestion.    CORONARY ARTERY CALCIFICATION:  None    UPPER ABDOMEN: Hepatic steatosis.    MUSCULOSKELETAL: No acute bony abnormalities      Impression    IMPRESSION:   1.  Pneumomediastinum.  2.  No CT evidence of oral contrast leak in the chest.         ED MEDICATIONS:   Medications   sodium chloride 0.9 % infusion ( Intravenous $New Bag 2/16/25 4171)   sodium chloride 0.9% BOLUS 1,000 mL (0 mLs Intravenous Stopped 2/16/25 2702)    ondansetron (ZOFRAN) injection 4 mg (4 mg Intravenous $Given 2/16/25 1716)   ondansetron (ZOFRAN ODT) ODT tab 4 mg (4 mg Oral $Given 2/16/25 1638)   sodium chloride 0.9% BOLUS 1,000 mL (0 mLs Intravenous Stopped 2/16/25 1930)   iopamidol (ISOVUE-370) solution 68 mL (68 mLs Intravenous $Given 2/16/25 1818)   sodium chloride 0.9 % bag 500mL for CT scan flush use (95 mLs Intravenous $Given 2/16/25 1818)   HYDROmorphone (PF) (DILAUDID) injection 0.3 mg (0.3 mg Intravenous $Given 2/16/25 2228)   diatrizoate meglumine-sodium (GASTROGRAFIN/GASTROVIEW) 66-10 % solution 120 mL (120 mLs Oral $Given 2/16/25 2318)   LORazepam (ATIVAN) injection 0.5 mg (0.5 mg Intravenous $Given 2/16/25 2358)   LORazepam (ATIVAN) injection 0.5 mg (0.5 mg Intravenous $Given 2/17/25 0135)   sodium chloride 0.9% BOLUS 500 mL (0 mLs Intravenous Stopped 2/17/25 0325)         Impression:    ICD-10-CM    1. Nausea and vomiting, unspecified vomiting type  R11.2       2. Pneumomediastinum (H)  J98.2       3. Chronic alcohol abuse  F10.10           Plan:    Pending studies include Taking orals to see if she tolerates. If tolerating ok for discharge.      4:24 AM: Patient assessed at bedside.  Resting comfortably.  Heart rate 104 at rest but when she moves around jumps up to the 110's.  Patient reports he had no pain with swallowing and was able to tolerate some juice and applesauce.  Denies any difficulty breathing.  No nausea.  Denies shakiness or tremulousness.  Patient states he feels well and is ready to go home but does not have a ride.  She expects her mother to wake up in the next few hours and can come pick her up.        Zane Mcdaniel MD          Mcdaniel, Zane Sood MD  02/17/25 8490

## 2025-02-17 NOTE — ED NOTES
Per MD, pt allowed to drink.  Pt requested OJ.  Tolerating well, no troubles swallowing.  Pt states mid sternal pain is now around 5/10.  Will continue to monitor

## 2025-02-17 NOTE — DISCHARGE INSTRUCTIONS
Return if symptoms worsen or new symptoms develop.  Follow-up with your primary care physician this week for recheck.  Avoid alcohol.  Do not vape this could worsen your pneumomediastinum significantly.  Worsening shortness of breath worsening chest pain fevers abdominal pain back pain focal numbness weakness any extremity or other symptoms present please return for recheck.  Use Zofran as needed for nausea and avoid alcohol.  Continue to drink significant amount of fluids.

## 2025-02-17 NOTE — ED NOTES
Helped pt up to the bathroom and redrew labs.  Fluids offered to pt.  Pt stated chest pain is still 8/10, mid sternum.  Denies any other pain or discomfort.  See MAR for lab results.  VSS - See Flowsheets.  Will continue to monitor.

## 2025-02-17 NOTE — ED NOTES
Pt has been independent with ambulation and restroom during this shift.  Pt states mid-sternal pain is 8/10.  Pain meds given and pt waiting for CT scan.  Pt pleasant with staff.  Fluids and warm blankets offered.  Will continue to monitor.

## 2025-02-17 NOTE — ED NOTES
Betzy Islas MA 3/27/2019  Mailbox full   Pt has been resting quietly in bed.  Pt tolerated applesauce with no difficulties.  VSS - See Flowsheet.  Pt reports pain has decreased since last dose of Ativan.  Will continue to monitor.

## 2025-05-24 ENCOUNTER — HOSPITAL ENCOUNTER (OUTPATIENT)
Dept: ULTRASOUND IMAGING | Facility: CLINIC | Age: 30
Discharge: HOME | End: 2025-05-24
Attending: INTERNAL MEDICINE | Admitting: INTERNAL MEDICINE
Payer: COMMERCIAL

## 2025-05-24 DIAGNOSIS — K70.10 ALCOHOLIC HEPATITIS WITHOUT ASCITES (H): ICD-10-CM

## 2025-05-24 PROCEDURE — 76705 ECHO EXAM OF ABDOMEN: CPT
